# Patient Record
Sex: MALE | NOT HISPANIC OR LATINO | URBAN - METROPOLITAN AREA
[De-identification: names, ages, dates, MRNs, and addresses within clinical notes are randomized per-mention and may not be internally consistent; named-entity substitution may affect disease eponyms.]

---

## 2024-10-08 ENCOUNTER — EMERGENCY (EMERGENCY)
Facility: HOSPITAL | Age: 49
LOS: 1 days | Discharge: HOME | End: 2024-10-08
Attending: EMERGENCY MEDICINE
Payer: MEDICAID

## 2024-10-08 VITALS
OXYGEN SATURATION: 100 % | RESPIRATION RATE: 18 BRPM | SYSTOLIC BLOOD PRESSURE: 160 MMHG | TEMPERATURE: 98 F | HEIGHT: 70 IN | WEIGHT: 199.96 LBS | HEART RATE: 74 BPM | DIASTOLIC BLOOD PRESSURE: 99 MMHG

## 2024-10-08 LAB
ALBUMIN SERPL ELPH-MCNC: 4.2 G/DL — SIGNIFICANT CHANGE UP (ref 3.3–5)
ALP SERPL-CCNC: 74 U/L — SIGNIFICANT CHANGE UP (ref 40–120)
ALT FLD-CCNC: <27 U/L — SIGNIFICANT CHANGE UP (ref 10–45)
ANION GAP SERPL CALC-SCNC: 11 MMOL/L — SIGNIFICANT CHANGE UP (ref 5–17)
ANION GAP SERPL CALC-SCNC: 14 MMOL/L — SIGNIFICANT CHANGE UP (ref 5–17)
AST SERPL-CCNC: 79 U/L — HIGH (ref 10–40)
BASOPHILS # BLD AUTO: 0.04 K/UL — SIGNIFICANT CHANGE UP (ref 0–0.2)
BASOPHILS NFR BLD AUTO: 0.4 % — SIGNIFICANT CHANGE UP (ref 0–2)
BILIRUB SERPL-MCNC: 0.6 MG/DL — SIGNIFICANT CHANGE UP (ref 0.2–1.2)
BUN SERPL-MCNC: 11 MG/DL — SIGNIFICANT CHANGE UP (ref 7–23)
BUN SERPL-MCNC: 12 MG/DL — SIGNIFICANT CHANGE UP (ref 7–23)
CALCIUM SERPL-MCNC: 9.6 MG/DL — SIGNIFICANT CHANGE UP (ref 8.4–10.5)
CALCIUM SERPL-MCNC: 9.9 MG/DL — SIGNIFICANT CHANGE UP (ref 8.4–10.5)
CHLORIDE SERPL-SCNC: 102 MMOL/L — SIGNIFICANT CHANGE UP (ref 96–108)
CHLORIDE SERPL-SCNC: 103 MMOL/L — SIGNIFICANT CHANGE UP (ref 96–108)
CO2 SERPL-SCNC: 21 MMOL/L — LOW (ref 22–31)
CO2 SERPL-SCNC: 23 MMOL/L — SIGNIFICANT CHANGE UP (ref 22–31)
CREAT SERPL-MCNC: 0.88 MG/DL — SIGNIFICANT CHANGE UP (ref 0.5–1.3)
CREAT SERPL-MCNC: 1.09 MG/DL — SIGNIFICANT CHANGE UP (ref 0.5–1.3)
EGFR: 104 ML/MIN/1.73M2 — SIGNIFICANT CHANGE UP
EGFR: 82 ML/MIN/1.73M2 — SIGNIFICANT CHANGE UP
EOSINOPHIL # BLD AUTO: 0.14 K/UL — SIGNIFICANT CHANGE UP (ref 0–0.5)
EOSINOPHIL NFR BLD AUTO: 1.4 % — SIGNIFICANT CHANGE UP (ref 0–6)
GLUCOSE SERPL-MCNC: 119 MG/DL — HIGH (ref 70–99)
GLUCOSE SERPL-MCNC: 94 MG/DL — SIGNIFICANT CHANGE UP (ref 70–99)
HCT VFR BLD CALC: 47.7 % — SIGNIFICANT CHANGE UP (ref 39–50)
HGB BLD-MCNC: 15.7 G/DL — SIGNIFICANT CHANGE UP (ref 13–17)
IMM GRANULOCYTES NFR BLD AUTO: 0.5 % — SIGNIFICANT CHANGE UP (ref 0–0.9)
LYMPHOCYTES # BLD AUTO: 1.55 K/UL — SIGNIFICANT CHANGE UP (ref 1–3.3)
LYMPHOCYTES # BLD AUTO: 15.3 % — SIGNIFICANT CHANGE UP (ref 13–44)
MCHC RBC-ENTMCNC: 29.7 PG — SIGNIFICANT CHANGE UP (ref 27–34)
MCHC RBC-ENTMCNC: 32.9 GM/DL — SIGNIFICANT CHANGE UP (ref 32–36)
MCV RBC AUTO: 90.3 FL — SIGNIFICANT CHANGE UP (ref 80–100)
MONOCYTES # BLD AUTO: 1.08 K/UL — HIGH (ref 0–0.9)
MONOCYTES NFR BLD AUTO: 10.7 % — SIGNIFICANT CHANGE UP (ref 2–14)
NEUTROPHILS # BLD AUTO: 7.24 K/UL — SIGNIFICANT CHANGE UP (ref 1.8–7.4)
NEUTROPHILS NFR BLD AUTO: 71.7 % — SIGNIFICANT CHANGE UP (ref 43–77)
NRBC # BLD: 0 /100 WBCS — SIGNIFICANT CHANGE UP (ref 0–0)
PLATELET # BLD AUTO: 299 K/UL — SIGNIFICANT CHANGE UP (ref 150–400)
POTASSIUM SERPL-MCNC: 3.4 MMOL/L — LOW (ref 3.5–5.3)
POTASSIUM SERPL-MCNC: 6.9 MMOL/L — CRITICAL HIGH (ref 3.5–5.3)
POTASSIUM SERPL-SCNC: 3.4 MMOL/L — LOW (ref 3.5–5.3)
POTASSIUM SERPL-SCNC: 6.9 MMOL/L — CRITICAL HIGH (ref 3.5–5.3)
PROT SERPL-MCNC: 8 G/DL — SIGNIFICANT CHANGE UP (ref 6–8.3)
RBC # BLD: 5.28 M/UL — SIGNIFICANT CHANGE UP (ref 4.2–5.8)
RBC # FLD: 12.4 % — SIGNIFICANT CHANGE UP (ref 10.3–14.5)
SODIUM SERPL-SCNC: 135 MMOL/L — SIGNIFICANT CHANGE UP (ref 135–145)
SODIUM SERPL-SCNC: 139 MMOL/L — SIGNIFICANT CHANGE UP (ref 135–145)
WBC # BLD: 10.1 K/UL — SIGNIFICANT CHANGE UP (ref 3.8–10.5)
WBC # FLD AUTO: 10.1 K/UL — SIGNIFICANT CHANGE UP (ref 3.8–10.5)

## 2024-10-08 PROCEDURE — 70487 CT MAXILLOFACIAL W/DYE: CPT | Mod: 26,MC

## 2024-10-08 PROCEDURE — 99223 1ST HOSP IP/OBS HIGH 75: CPT

## 2024-10-08 RX ORDER — KETOROLAC TROMETHAMINE 10 MG/1
15 TABLET, FILM COATED ORAL ONCE
Refills: 0 | Status: DISCONTINUED | OUTPATIENT
Start: 2024-10-08 | End: 2024-10-08

## 2024-10-08 RX ORDER — AMPICILLIN, SULBACTAM 250; 125 MG/ML; MG/ML
3 INJECTION, POWDER, FOR SOLUTION INTRAMUSCULAR; INTRAVENOUS ONCE
Refills: 0 | Status: COMPLETED | OUTPATIENT
Start: 2024-10-08 | End: 2024-10-08

## 2024-10-08 RX ORDER — AMPICILLIN, SULBACTAM 250; 125 MG/ML; MG/ML
3 INJECTION, POWDER, FOR SOLUTION INTRAMUSCULAR; INTRAVENOUS EVERY 6 HOURS
Refills: 0 | Status: DISCONTINUED | OUTPATIENT
Start: 2024-10-08 | End: 2024-10-09

## 2024-10-08 RX ORDER — CHLORHEXIDINE GLUCONATE ORAL RINSE 1.2 MG/ML
15 SOLUTION DENTAL
Refills: 0 | Status: ACTIVE | OUTPATIENT
Start: 2024-10-08 | End: 2025-09-06

## 2024-10-08 RX ADMIN — CHLORHEXIDINE GLUCONATE ORAL RINSE 15 MILLILITER(S): 1.2 SOLUTION DENTAL at 22:17

## 2024-10-08 RX ADMIN — KETOROLAC TROMETHAMINE 15 MILLIGRAM(S): 10 TABLET, FILM COATED ORAL at 15:14

## 2024-10-08 RX ADMIN — KETOROLAC TROMETHAMINE 15 MILLIGRAM(S): 10 TABLET, FILM COATED ORAL at 23:45

## 2024-10-08 RX ADMIN — KETOROLAC TROMETHAMINE 15 MILLIGRAM(S): 10 TABLET, FILM COATED ORAL at 18:17

## 2024-10-08 RX ADMIN — KETOROLAC TROMETHAMINE 15 MILLIGRAM(S): 10 TABLET, FILM COATED ORAL at 23:19

## 2024-10-08 RX ADMIN — AMPICILLIN, SULBACTAM 200 GRAM(S): 250; 125 INJECTION, POWDER, FOR SOLUTION INTRAMUSCULAR; INTRAVENOUS at 15:14

## 2024-10-08 RX ADMIN — AMPICILLIN, SULBACTAM 200 GRAM(S): 250; 125 INJECTION, POWDER, FOR SOLUTION INTRAMUSCULAR; INTRAVENOUS at 21:39

## 2024-10-08 NOTE — ED ADULT NURSE REASSESSMENT NOTE - NS ED NURSE REASSESS COMMENT FT1
Pt received from NIKKI Garza at 1900. Pt oriented to CDU and plan of care was discussed. Pt is observed for facial cellulitis, here for IV ABX, pain control. Pt c/o R facial and dental mouth pain, 3/10, medicated as per order, see eMAR. Denies difficulty chewing/swallowing, throat swelling. Swelling noted to R side of face. A&Ox4, obeys commands, ambulatory, independent. Denies HA, dizziness, blurry vision. Respirations spontaneous and unlabored. Denies SOB, dyspnea, cough, CP, palpitations. On CM, NSR. IV site patent, no signs of infiltration noted. Denies N/V/D/C. Pt afebrile, denies chills. Pt resting in bed. Safety & comfort measures maintained. Call bell within reach.

## 2024-10-08 NOTE — ED CDU PROVIDER DISPOSITION NOTE - NSFOLLOWUPINSTRUCTIONS_ED_ALL_ED_FT
1. Follow up with your PCP within 2-3 days. Follow up with your dentist within 2-3 days.   2. Take antibiotics as directed.   3. Take Ibuprofen (i.e. Motrin, Advil) 600mg every 8 hrs for pain as needed. Take with food.   May alternate with Acetaminophen (Tylenol) 650mg every 6 hours for pain as needed.  4. Return to the emergency department if you have worsening pain, swelling, fevers, or all other concerning symptoms.

## 2024-10-08 NOTE — ED ADULT NURSE NOTE - NSFALLUNIVINTERV_ED_ALL_ED
Bed/Stretcher in lowest position, wheels locked, appropriate side rails in place/Call bell, personal items and telephone in reach/Instruct patient to call for assistance before getting out of bed/chair/stretcher/Non-slip footwear applied when patient is off stretcher/[place] to call system/Physically safe environment - no spills, clutter or unnecessary equipment/Purposeful proactive rounding/Room/bathroom lighting operational, light cord in reach

## 2024-10-08 NOTE — ED PROVIDER NOTE - ATTENDING APP SHARED VISIT CONTRIBUTION OF CARE
This was a shared visit with MOOKIE.  I have reviewed and discussed the case with the MOOKIE and agree with verified documentation unless otherwise documented.  I have independently spoken with and examined the patient and my documentation of history/pe and MDM are above.

## 2024-10-08 NOTE — ED PROVIDER NOTE - OBJECTIVE STATEMENT
verbal cues/1 person assist 48 y/o male PMHx current smoker 1ppd x15 years now presenting to the ED with right sided facial pain and swelling since last night. Patient reported right upper maxillary pain and foul tasting discharge since yesterday. Took advil last night with some relief of pain and was sent in by outpt dentist concern for dental infection. Had similar presentation two months ago that resolved with oral antibiotics. denied fever, redness, URI symptoms, ear pain

## 2024-10-08 NOTE — PROGRESS NOTE ADULT - SUBJECTIVE AND OBJECTIVE BOX
Patient is a 49y old  Male who presents with a chief complaint of     HPI:      PAST MEDICAL & SURGICAL HISTORY:  No pertinent past medical history        (   ) heart valve replacement  (   ) joint replacement  (   ) pregnancy    MEDICATIONS  (STANDING):  ampicillin/sulbactam  IVPB 3 Gram(s) IV Intermittent every 6 hours  chlorhexidine 0.12% Liquid 15 milliLiter(s) Swish and Spit two times a day    MEDICATIONS  (PRN):      Allergies    No Known Allergies    Intolerances        FAMILY HISTORY:      *SOCIAL HISTORY: (   ) Tobacco; (   ) ETOH    *Last Dental Visit:    Vital Signs Last 24 Hrs  T(C): 36.9 (08 Oct 2024 20:45), Max: 37.2 (08 Oct 2024 15:28)  T(F): 98.5 (08 Oct 2024 20:45), Max: 98.9 (08 Oct 2024 15:28)  HR: 72 (08 Oct 2024 20:45) (72 - 74)  BP: 154/94 (08 Oct 2024 20:45) (154/94 - 160/101)  BP(mean): 114 (08 Oct 2024 20:45) (114 - 114)  RR: 17 (08 Oct 2024 20:45) (17 - 18)  SpO2: 97% (08 Oct 2024 20:45) (96% - 100%)    Parameters below as of 08 Oct 2024 20:45  Patient On (Oxygen Delivery Method): room air        LABS:                        15.7   10.10 )-----------( 299      ( 08 Oct 2024 15:20 )             47.7     10-08    139  |  102  |  12  ----------------------------<  119[H]  3.4[L]   |  23  |  1.09    Ca    9.6      08 Oct 2024 18:27    TPro  8.0  /  Alb  4.2  /  TBili  0.6  /  DBili  x   /  AST  79[H]  /  ALT  <27  /  AlkPhos  74  10-08    WBC Count: 10.10 K/uL [3.80 - 10.50] (10-08 @ 15:20)  Platelet Count - Automated: 299 K/uL [150 - 400] (10-08 @ 15:20)    Urinalysis Basic - ( 08 Oct 2024 18:27 )    Color: x / Appearance: x / SG: x / pH: x  Gluc: 119 mg/dL / Ketone: x  / Bili: x / Urobili: x   Blood: x / Protein: x / Nitrite: x   Leuk Esterase: x / RBC: x / WBC x   Sq Epi: x / Non Sq Epi: x / Bacteria: x          EOE:  TMJ (   ) clicks                     (   ) pops                     (   ) crepitus             Mandible <<FROM>>             Facial bones and MOM <<grossly intact>>             (   ) trismus             (   ) lymphadenopathy             (   ) swelling             (   ) asymmetry             (   ) palpation             (   ) dyspnea             (   ) dysphagia             (   ) loss of consciousness    IOE:  <<permanent/primary/mixed>> dentition: <<grossly intact>> OR <<multiple carious teeth>> OR <<multiple missing teeth>>           hard/soft palate:  (   ) palatal torus, <<No pathology noted>>           tongue/FOM <<No pathology noted>>           labial/buccal mucosa <<No pathology noted>>           (   ) percussion           (   ) palpation           (   ) swelling            (   ) abscess           (   ) sinus tract    Dentition present: <<   >>  Mobility: <<  >>  Caries: <<   >>         *DENTAL RADIOGRAPHS:    RADIOLOGY & ADDITIONAL STUDIES:     *ASSESSMENT:      *PLAN:    PROCEDURE:   Verbal and written consent given.  Anesthesia: <<    >>   Treatment: <<    >>     RECOMMENDATIONS:  1) <<   >>  2) Dental F/U with outpatient dentist for comprehensive dental care.   3) If any difficulty swallowing/breathing, fever occur, return to ER.     Resident Name, pager # Patient is a 49y old  Male who presents with a chief complaint of Right facial swelling and pain.     HPI: Patient had right facial swelling that occurred 2 days ago. Patient reported pain on the upper right side of his face but no tooth pain reported. Patient noted foul tasting discharge yesterday. Patient was seen by outpatient dentist yesterday and was told to come to ED as patient was too swollen to perform examination. Patient had previously been to ED for similar symptoms. No fever, dysphagia, loss of consciousness, or ear pain noted. Patient had CT scan taken before dental arrival along with 1 round of IV Unasyn.       PAST MEDICAL & SURGICAL HISTORY:  No pertinent past medical history        ( - ) heart valve replacement  ( - ) joint replacement  ( - ) pregnancy    MEDICATIONS  (STANDING):  ampicillin/sulbactam  IVPB 3 Gram(s) IV Intermittent every 6 hours  chlorhexidine 0.12% Liquid 15 milliLiter(s) Swish and Spit two times a day    Allergies    No Known Allergies    Intolerances        FAMILY HISTORY:      *SOCIAL HISTORY: ( - ) Tobacco; ( - ) ETOH    *Last Dental Visit: Yesterday     Vital Signs Last 24 Hrs  T(C): 36.9 (08 Oct 2024 20:45), Max: 37.2 (08 Oct 2024 15:28)  T(F): 98.5 (08 Oct 2024 20:45), Max: 98.9 (08 Oct 2024 15:28)  HR: 72 (08 Oct 2024 20:45) (72 - 74)  BP: 154/94 (08 Oct 2024 20:45) (154/94 - 160/101)  BP(mean): 114 (08 Oct 2024 20:45) (114 - 114)  RR: 17 (08 Oct 2024 20:45) (17 - 18)  SpO2: 97% (08 Oct 2024 20:45) (96% - 100%)    Parameters below as of 08 Oct 2024 20:45  Patient On (Oxygen Delivery Method): room air        LABS:                        15.7   10.10 )-----------( 299      ( 08 Oct 2024 15:20 )             47.7     10-08    139  |  102  |  12  ----------------------------<  119[H]  3.4[L]   |  23  |  1.09    Ca    9.6      08 Oct 2024 18:27    TPro  8.0  /  Alb  4.2  /  TBili  0.6  /  DBili  x   /  AST  79[H]  /  ALT  <27  /  AlkPhos  74  10-08    WBC Count: 10.10 K/uL [3.80 - 10.50] (10-08 @ 15:20)  Platelet Count - Automated: 299 K/uL [150 - 400] (10-08 @ 15:20)    Urinalysis Basic - ( 08 Oct 2024 18:27 )    Color: x / Appearance: x / SG: x / pH: x  Gluc: 119 mg/dL / Ketone: x  / Bili: x / Urobili: x   Blood: x / Protein: x / Nitrite: x   Leuk Esterase: x / RBC: x / WBC x   Sq Epi: x / Non Sq Epi: x / Bacteria: x          EOE:  TMJ ( - ) clicks                     ( - ) pops                     ( - ) crepitus             Mandible <<FROM>>             Facial bones and MOM <<grossly intact>>             ( - ) trismus             ( - ) lymphadenopathy             ( + ) swelling: upper right facial swelling              ( - ) asymmetry             ( + ) palpation: tender to palpation on patients right side              ( - ) dyspnea             ( - ) dysphagia             ( - ) loss of consciousness    IOE:  permanent dentition:multiple missing teeth           hard/soft palate:  ( - ) palatal torus, No pathology noted           tongue/FOM No pathology noted           labial/buccal mucosa No pathology noted           ( - ) percussion           ( + ) palpation           ( + ) swelling: moderate fluctuant swelling on the buccal gingiva of #3           ( + ) abscess: abscess noted on the buccal aspect of #3            ( + ) sinus tract: drainage noted on the buccal aspect of #3     Dentition present: Missing #1-2, 4-5, 13, 15-20, #29-32  #3 RCT, #12 DO amalgam, #15 RCT, #21 DO amalgam  Mobility: N/A  Caries: Distal caries on #12 and 21         *DENTAL RADIOGRAPHS: Panorex and PA of #3 was taken and interpreted. PARL noted on #3. No other pathology noted.     RADIOLOGY & ADDITIONAL STUDIES: Periapical lucency right maxillary molar with erosion of the buccal aspect of the alveolar process with an adjacent 1.5 x 0.6 x 1.3 cm subperiosteal abscess. Overlying soft tissue swelling.    *ASSESSMENT: Right facial swelling is likely contributed from PAP from #3. #3 needs defintive tx plan to resolve odontogenic infection. Patient recommended to follow up with outside dentist for definitive tx plan. Flucuant swelling noted on #3 and indicated for incision and drainage.       *PLAN: Incision and Drainage on the buccal aspect of gingiva of #3    PROCEDURE:   Verbal and written consent given.  Anesthesia: Topical Benzocaine was applied to soft tissues. 1 caruple of 4% Articaine 1:100,000 Epinephrine was administered as local intra-oral infiltration. Profound anesthesia was achieved.    Treatment: 15 blade was used to make incision on the buccal aspect of #3. Hemostat used to blunt dissect until bone was reached. Hemopurulence was achieved. Patient was recommended to place drain in. R/B/A discussed. Patient elected to not have drain placed despite recommendations. Drain was not placed. Incision was irrigated with copious amounts of sterile saline solution. Hemostasis was achieved. Post-op instructions provided verbally.     RECOMMENDATIONS:  1) Finish IV Unasyn and then discharge on PO Augmentin for 7 days and OTC Pain Meds per ED Team   2) Avoid spicy and hot foods, warm salt water and Peridex rinses   3) Dental F/U with outpatient dentist for definitive comprehensive tx on #3.   3) If any difficulty swallowing/breathing, fever occur, return to ER.     Misael Lowery DDS, #[id number]

## 2024-10-08 NOTE — ED PROVIDER NOTE - CLINICAL SUMMARY MEDICAL DECISION MAKING FREE TEXT BOX
Desean 49-year-old male no past medical issues presents with 2 days of right facial pain patient seen by dentist today and sent into the ED for evaluation on examination significant swelling of right side of face tender fluctuant concern for abscess teeth nontender poor dentition bottom lower molar but nontender to touch facial cellulitis with concern for abscess possible odontogenic etiology no submandibular swelling no elevation of the tongue no stridor patient denies any fever hemodynamically stable will give IV antibiotics CT max face and if etiology appears to be on dental genic consider dental for drainage likely hobs versus admission

## 2024-10-08 NOTE — ED CDU PROVIDER INITIAL DAY NOTE - PROGRESS NOTE DETAILS
CDU PROGRESS NOTE PA JUAN: Received pt at 1900 sign-out. Case/plan reviewed. CT maxfac shows Periapical lucency right maxillary molar with erosion of the buccal   aspect of the alveolar process with an adjacent 1.5 x 0.6 x 1.3 cm subperiosteal abscess. Overlying soft tissue swelling. Pt resting in stretcher in NAD, afebrile but hypertensive. HEENT exam significant for right soft tissue facial swelling. Moderate fluctuant swelling on the buccal gingiva of #3 w/ mild ttp. Pt declines analgesia, Dental paged. s/p I&D w/ Dental. Pt requesting analgesia for pain. Will give Toradol 15mg IVP.

## 2024-10-08 NOTE — ED CDU PROVIDER DISPOSITION NOTE - PATIENT PORTAL LINK FT
You can access the FollowMyHealth Patient Portal offered by [facility] by registering at the following website: [email or web address]. By joining [facility]’s FollowMyHealth portal, you will also be able to view your health information using other applications (apps) compatible with our system.

## 2024-10-08 NOTE — ED ADULT TRIAGE NOTE - BSA (M2)
How Severe Are Your Spot(S)?: mild What Is The Reason For Today's Visit?: Full Body Skin Examination What Is The Reason For Today's Visit? (Being Monitored For X): concerning skin lesions on a periodic basis 2.09

## 2024-10-08 NOTE — ED ADULT NURSE NOTE - OBJECTIVE STATEMENT
49yo M w/ no PMH presents to ED c/o right facial swelling. Pt A&Ox3, states that he noticed a "bump" to his right cheek last night, woke up this morning and had swelling to the right side of his face, went to the dentist this morning and he was sent to ED for IV antibiotics. Pt states he is unsure if he has an abscess or not, dentist did not tel him, he does note that there are no broken teeth and denies any tooth pain. Denies fevers/chills. Swelling noted to right side of pt's face, mild tenderness on palpation of cheek. Ambulatory w/ steady gait in ED. Stretcher locked and in lowest position, appropriate side rails up. Pt instructed to notify RN if assistance is needed.

## 2024-10-08 NOTE — ED ADULT NURSE REASSESSMENT NOTE - NS ED NURSE REASSESS COMMENT FT1
report received from Treasure JORDAN in CDU. Pt A&Ox4, ambulatory, here for facial swelling suspected to be related to tooth abcess. pending CT results, here for IV abx

## 2024-10-08 NOTE — ED CDU PROVIDER INITIAL DAY NOTE - OBJECTIVE STATEMENT
50 y/o male PMHx current smoker 1ppd x15 years now presenting to the ED with right sided facial pain and swelling since last night. Patient reported right upper maxillary pain and foul tasting discharge since yesterday. Took advil last night with some relief of pain and was sent in by outpt dentist concern for dental infection. Had similar presentation two months ago that resolved with oral antibiotics. denied fever, redness, URI symptoms, ear pain. While in ED patient had labs and CT scan. Will place in CDU for IV ABX and pain control.

## 2024-10-08 NOTE — ED CDU PROVIDER DISPOSITION NOTE - CLINICAL COURSE
48 y/o male PMHx current smoker 1ppd x15 years now presenting to the ED with right sided facial pain and swelling since last night. Patient reported right upper maxillary pain and foul tasting discharge since yesterday. Took advil last night with some relief of pain and was sent in by outpt dentist concern for dental infection. Had similar presentation two months ago that resolved with oral antibiotics. denied fever, redness, URI symptoms, ear pain. While in ED patient had labs and CT scan. Will place in CDU for IV ABX and pain control.     while in CDU___ 50 y/o male PMHx current smoker 1ppd x15 years now presenting to the ED with right sided facial pain and swelling since last night. Patient reported right upper maxillary pain and foul tasting discharge since yesterday. Took advil last night with some relief of pain and was sent in by outpt dentist concern for dental infection. Had similar presentation two months ago that resolved with oral antibiotics. denied fever, redness, URI symptoms, ear pain. While in ED patient had labs and CT scan. Will place in CDU for IV ABX and pain control.     while in CDU- pt seen and evaluated by Dental, Abscess drained. IV unasyn given. pt feeling much better. No fever. tolerating po. rx for augmentin sent to the pharmacy, strict return precautions advised. case discussed with shelly Martinez for discharge.

## 2024-10-09 VITALS
SYSTOLIC BLOOD PRESSURE: 112 MMHG | RESPIRATION RATE: 18 BRPM | TEMPERATURE: 99 F | OXYGEN SATURATION: 100 % | DIASTOLIC BLOOD PRESSURE: 74 MMHG | HEART RATE: 97 BPM

## 2024-10-09 PROCEDURE — 80053 COMPREHEN METABOLIC PANEL: CPT

## 2024-10-09 PROCEDURE — G0378: CPT

## 2024-10-09 PROCEDURE — 80048 BASIC METABOLIC PNL TOTAL CA: CPT

## 2024-10-09 PROCEDURE — 99238 HOSP IP/OBS DSCHRG MGMT 30/<: CPT

## 2024-10-09 PROCEDURE — 96374 THER/PROPH/DIAG INJ IV PUSH: CPT | Mod: XU

## 2024-10-09 PROCEDURE — 99284 EMERGENCY DEPT VISIT MOD MDM: CPT | Mod: 25

## 2024-10-09 PROCEDURE — 96375 TX/PRO/DX INJ NEW DRUG ADDON: CPT | Mod: XU

## 2024-10-09 PROCEDURE — 96376 TX/PRO/DX INJ SAME DRUG ADON: CPT

## 2024-10-09 PROCEDURE — 85025 COMPLETE CBC W/AUTO DIFF WBC: CPT

## 2024-10-09 PROCEDURE — 70487 CT MAXILLOFACIAL W/DYE: CPT | Mod: MC

## 2024-10-09 RX ADMIN — Medication 1 TABLET(S): at 09:14

## 2024-10-09 RX ADMIN — CHLORHEXIDINE GLUCONATE ORAL RINSE 15 MILLILITER(S): 1.2 SOLUTION DENTAL at 09:14

## 2024-10-09 RX ADMIN — AMPICILLIN, SULBACTAM 200 GRAM(S): 250; 125 INJECTION, POWDER, FOR SOLUTION INTRAMUSCULAR; INTRAVENOUS at 03:28

## 2024-10-09 NOTE — ED CDU PROVIDER SUBSEQUENT DAY NOTE - HISTORY
CDU PROGRESS NOTE PA JUAN: Pt resting comfortably, s/p I&D w/ Dental. Hemopurulence was achieved. Patient was recommended to place drain in. Patient elected to not have drain placed despite recommendations. Dental recs appreciated, Finish IV Unasyn and then discharge on PO Augmentin for 7 days and Pain Meds. Peridex.

## 2024-10-09 NOTE — ED CDU PROVIDER SUBSEQUENT DAY NOTE - ATTENDING APP SHARED VISIT CONTRIBUTION OF CARE
48 yo male no PMH p/w R facial swelling/pain x several days.  outpatient dentist referred here, had maxillofacial CT with R maxillary molar lesion/abscess.  seen by dental here, I+D.  dental saw again this AM.    has been on IV unasyn.  cleared for discharged with augmentin PO, outpatient dental follow-up.

## 2024-10-09 NOTE — ED CDU PROVIDER SUBSEQUENT DAY NOTE - PROGRESS NOTE DETAILS
pt seen and evaluated by Dental, Abscess drained. IV unasyn given. pt feeling much better. No fever. tolerating po. rx for augmentin sent to the pharmacy, strict return precautions advised. case discussed with Dr. Castro, stable for discharge.